# Patient Record
Sex: MALE | Race: WHITE | NOT HISPANIC OR LATINO | URBAN - METROPOLITAN AREA
[De-identification: names, ages, dates, MRNs, and addresses within clinical notes are randomized per-mention and may not be internally consistent; named-entity substitution may affect disease eponyms.]

---

## 2017-01-17 ENCOUNTER — OFFICE (OUTPATIENT)
Dept: URBAN - METROPOLITAN AREA CLINIC 101 | Facility: CLINIC | Age: 72
End: 2017-01-17

## 2017-01-17 VITALS
HEART RATE: 94 BPM | HEIGHT: 70 IN | TEMPERATURE: 97.9 F | WEIGHT: 214 LBS | SYSTOLIC BLOOD PRESSURE: 130 MMHG | DIASTOLIC BLOOD PRESSURE: 78 MMHG

## 2017-01-17 DIAGNOSIS — K57.32 DIVERTICULITIS OF LARGE INTESTINE WITHOUT PERFORATION OR ABS: ICD-10-CM

## 2017-01-17 PROCEDURE — 99214 OFFICE O/P EST MOD 30 MIN: CPT

## 2017-01-17 NOTE — SERVICEHPINOTES
MONA CAMILO   is a   71   year old male who is being seen in consultation at the request of   KEISHA REED   for diverticulitis. Colonoscopy 11/18/2015-mild sigmoid diverticulosis, internal hemorrhoids and tubular adenomas-repeat in 5 years.  He went to [facility] ER 4/30/16 had CT abd/pelvis w/o contrast-mild thickening of distal esophageal wall, mild acute diverticulitis of mid distal descending colon. He went to urgent care a couple of weeks ago for left lower abdominal pain. He was started on flagyl x 10 days and levaquin x10 days for presumed diverticulitis, he has one day left of antibiotics. He continued with abdominal pain this prompted him to see his PCP a week after he got back from vacation and was already on antibiotics. He had a ct of the abdomen and pelvis w/o contrast 1/14/2017-diverticulosis, no definite inflammatory change, no abscess. He continues with mild LLQ pain. Notes pain with applying pressure to the abdomen. Reports nauseous with the flagyl. Denies fever and chills. He has a BM 3-4 times a day. Stools are mostly [place] stool scale type 6. No blood or mucous present. He was treated twice last year for diverticulitis once with our group and once with his PCP.He has stage III CKD he follows with Dr. Ureña-nephrologist in [place].

## 2024-11-01 ENCOUNTER — NEW PATIENT (OUTPATIENT)
Dept: URBAN - METROPOLITAN AREA CLINIC 66 | Facility: CLINIC | Age: 79
End: 2024-11-01

## 2024-11-01 DIAGNOSIS — H43.813: ICD-10-CM

## 2024-11-01 DIAGNOSIS — H33.321: ICD-10-CM

## 2024-11-01 PROCEDURE — 99204 OFFICE O/P NEW MOD 45 MIN: CPT

## 2024-11-01 PROCEDURE — 92134 CPTRZ OPH DX IMG PST SGM RTA: CPT

## 2024-11-01 PROCEDURE — 92201 OPSCPY EXTND RTA DRAW UNI/BI: CPT

## 2024-11-01 ASSESSMENT — VISUAL ACUITY
OS_SC: 20/20
OD_SC: 20/40

## 2024-11-01 ASSESSMENT — TONOMETRY
OD_IOP_MMHG: 13
OS_IOP_MMHG: 11